# Patient Record
Sex: FEMALE | Race: WHITE | NOT HISPANIC OR LATINO | Employment: FULL TIME | URBAN - NONMETROPOLITAN AREA
[De-identification: names, ages, dates, MRNs, and addresses within clinical notes are randomized per-mention and may not be internally consistent; named-entity substitution may affect disease eponyms.]

---

## 2019-08-15 ENCOUNTER — TELEPHONE (OUTPATIENT)
Dept: BARIATRICS/WEIGHT MGMT | Facility: CLINIC | Age: 34
End: 2019-08-15

## 2019-08-15 NOTE — TELEPHONE ENCOUNTER
Patient called in stating that she had watched the online seminar and would like to make an appointment. Scheduled pt an appointment 9/5/19

## 2019-09-05 ENCOUNTER — OFFICE VISIT (OUTPATIENT)
Dept: BARIATRICS/WEIGHT MGMT | Facility: CLINIC | Age: 34
End: 2019-09-05

## 2019-09-05 ENCOUNTER — APPOINTMENT (OUTPATIENT)
Dept: BARIATRICS/WEIGHT MGMT | Facility: HOSPITAL | Age: 34
End: 2019-09-05

## 2019-09-05 VITALS
TEMPERATURE: 99.1 F | HEIGHT: 63 IN | WEIGHT: 293 LBS | SYSTOLIC BLOOD PRESSURE: 138 MMHG | OXYGEN SATURATION: 98 % | DIASTOLIC BLOOD PRESSURE: 89 MMHG | BODY MASS INDEX: 51.91 KG/M2 | HEART RATE: 94 BPM

## 2019-09-05 DIAGNOSIS — I10 ESSENTIAL HYPERTENSION: ICD-10-CM

## 2019-09-05 DIAGNOSIS — E66.01 CLASS 3 SEVERE OBESITY DUE TO EXCESS CALORIES WITH SERIOUS COMORBIDITY AND BODY MASS INDEX (BMI) OF 50.0 TO 59.9 IN ADULT (HCC): Primary | ICD-10-CM

## 2019-09-05 PROBLEM — E66.813 CLASS 3 SEVERE OBESITY DUE TO EXCESS CALORIES WITH SERIOUS COMORBIDITY AND BODY MASS INDEX (BMI) OF 50.0 TO 59.9 IN ADULT: Status: ACTIVE | Noted: 2019-09-05

## 2019-09-05 PROCEDURE — 99204 OFFICE O/P NEW MOD 45 MIN: CPT | Performed by: SURGERY

## 2019-09-05 RX ORDER — AMLODIPINE BESYLATE 5 MG/1
5 TABLET ORAL DAILY
Refills: 3 | COMMUNITY
Start: 2019-06-26

## 2019-09-05 RX ORDER — HYDROCHLOROTHIAZIDE 25 MG/1
25 TABLET ORAL DAILY
COMMUNITY

## 2019-09-05 NOTE — PROGRESS NOTES
"Nutrition Services    Patient Name:  Ginette Mireles  YOB: 1985  MRN: [id number]  Admit Date:  (Not on file)    NUTRITION BARIATRIC/[facility] NOTE     Visit 1  Initial Assessment      Anthropometrics   Height: 63 in  Weight: 295 lb 3.2 oz  BMI:52.3  Waist:    Nutrition Recall  Eating _3___ meals daily   Snacking-Snacks when she is at home  Limited sweet intake- Eats 1 or 2 at night, however not every night  Drinking carbonated beverages-1 per day, but usually drinks water or sweet tea  Drinking less than 64 fluid ounces    Exercise   Walking:    Education    4 meal per day diet plan  Goal Setting and Information Packet  Protein content of Food handout\"  Reinforce Nutritional Needs for Surgery  Reinforce Nutritional Needs for [facility]    Nutrition Goals   Eat __4___ meals per day with protein  Protein goal: 65gms     discussed protein guidelines for shakes and bar  Eliminate snacks  Healthier food choices  Portion control / Use smaller plate or measuring cup   Eliminate soda  Replace sugar beverages with artifical sweetened   Increase fluid intake to 64 ounces per day    Exercise Goals  Continue current exercise routine         Electronically signed by:  Cici Polk  09/05/19 10:04 AM   "

## 2019-09-05 NOTE — PROGRESS NOTES
Patient Care Team:  Camilo Lomas MD as PCP - General (Family Medicine)    Reason for Visit:  Surgical Weight loss    Subjective     Patient is a 34 y.o. female presents with morbid obesity and her Body mass index is 52.29 kg/m².     She is here for discussion of surgical weight loss options.  She stated she has been with the disease of obesity for year(s).  She stated she suffers from hypertension and morbid obesity due to her weight gain.  She stated that weight loss helps alleviate these symptoms.   She stated that she has tried counting calories and medications such as phentermine to help with weight loss.  She stated that she has attempted these conservative methods for weight loss without maintaining long term success.  Today she would like to discuss surgical weight loss options such as the Laparoscopic Sleeve Gastrectomy or the Laparoscopic R - Y Gastric Bypass.     Review of Systems  General ROS: positive for  - fatigue and weight gain  Psychological ROS: negative  Ophthalmic ROS: positive for - uses contacts and uses glasses  ENT ROS: negative  Endocrine ROS: positive for - hair pattern changes  Respiratory ROS: no cough, shortness of breath, or wheezing  Cardiovascular ROS: no chest pain or dyspnea on exertion  Gastrointestinal ROS: no abdominal pain, change in bowel habits, or black or bloody stools  Genito-Urinary ROS: positive for - incontinence  Musculoskeletal ROS: negative    History  Past Medical History:   Diagnosis Date   • Hypertension    • Varicose veins of both lower extremities      Past Surgical History:   Procedure Laterality Date   •  SECTION      x 1   • CHOLECYSTECTOMY      lap   • HERNIA REPAIR  1992     Family History   Problem Relation Age of Onset   • Cancer Mother    • Diabetes Mother    • Hypertension Mother    • Diabetes Father    • Hypertension Father    • Stroke Father    • Cancer Sister    • Hypertension Brother    • Cancer Maternal Grandmother    • COPD  Maternal Grandmother    • Diabetes Maternal Grandmother    • Hypertension Maternal Grandmother    • Obesity Maternal Grandmother    • Cancer Maternal Grandfather    • Hypertension Maternal Grandfather    • Hypertension Paternal Grandmother    • Heart disease Paternal Grandmother    • Stroke Paternal Grandmother    • Hypertension Paternal Grandfather    • No Known Problems Brother      Social History     Tobacco Use   • Smoking status: Former Smoker     Last attempt to quit: 2014     Years since quittin.0   • Smokeless tobacco: Never Used   • Tobacco comment: was a very light smoker   Substance Use Topics   • Alcohol use: Yes     Alcohol/week: 1.2 oz     Types: 2 Cans of beer per week     Comment: a couple of beers a month   • Drug use: No       (Not in a hospital admission)  Allergies:  Patient has no known allergies.      Current Outpatient Medications:   •  amLODIPine (NORVASC) 5 MG tablet, Take 5 mg by mouth Daily., Disp: , Rfl: 3  •  hydrochlorothiazide (HYDRODIURIL) 25 MG tablet, Take 25 mg by mouth Daily., Disp: , Rfl:     Objective     Vital Signs  Temp:  [99.1 °F (37.3 °C)] 99.1 °F (37.3 °C)  Heart Rate:  [94] 94  BP: (138)/(89) 138/89  Body mass index is 52.29 kg/m².      19  0906   Weight: 134 kg (295 lb 3.2 oz)       Physical Exam:      HEENT: extra ocular movement intact  Respiratory: appears well, vitals normal, no respiratory distress, acyanotic, normal RR, chest clear, no wheezing, crepitations, rhonchi, normal symmetric air entry  Cardiovascular: Regular rate and rhythm, S1, S2 normal, no murmur, click, rub or gallop  GI: Soft, non-tender, normal bowel sounds; no bruits, organomegaly or masses.  Abnormal shape: obese  Musculoskeletal: inspection - no abnormality  Neurologic: alert, oriented, normal speech, no focal findings or movement disorder noted       Results Review:   None        Assessment/Plan   Encounter Diagnoses   Name Primary?   • Class 3 severe obesity due to excess  "calories with serious comorbidity and body mass index (BMI) of 50.0 to 59.9 in adult (CMS/[facility]) Yes   • Essential hypertension        I believe this patient will be a good candidate for weight loss surgery.    She has chosen laparoscopic sleeve gastrectomy. I agree with this decision.  I have discussed the Ar - Y Gastric Bypass, laparoscopic sleeve gastrectomy and the Laparoscopic Gastric Band procedures to provide the alternatives which also includes non surgical weight loss options as well.  We discussed the benefits of the surgeries including the benefit of weight loss and the possible reversal of co-morbid conditions associated with morbid obesity. I explained to her that prior to making a definitive decision on the type of surgery she will require a study of the upper GI system.  I explained to her why the EGD is recommended.  She has been provided a structured dietary regimen based off of her behavior.  I discussed with the patient the etiology of the disease of obesity and the potential comorbid conditions associated with this disease.  She was instructed to follow the dietary regimen and follow-up with our program in 1 month's time with any additional questions as they may arise during this time.  We emphasized on focusing on proteins and meals high in fiber as well as adequate hydration that exceed 64 ounces of water daily.    I explained that I anticipate the patient to lose 6 pounds prior to her next monthly visit.  I have also explained that they need to record or document when they are going to have the \"cheat day\".  The patient reports that her hypertension has remained stable on its current treatment regimen.  I anticipate the patient's hypertension will improve if not reverse with reversal of her morbid obesity.  I discussed the patient's findings and my recommendations with patient.     I have also recommended that she obtain preoperative psychological evaluation, laboratory and cardiac evaluation " prior to surgery.    Dr. Hunter Santiago MD FACS    09/05/19  1:36 PM  Patient Care Team:  Camilo Lomas MD as PCP - General (Family Medicine)

## 2019-09-05 NOTE — ADDENDUM NOTE
Addended by: STACY BARCENAS on: 9/5/2019 01:37 PM     Modules accepted: Orders, Level of Service

## 2019-10-03 ENCOUNTER — OFFICE VISIT (OUTPATIENT)
Dept: BARIATRICS/WEIGHT MGMT | Facility: CLINIC | Age: 34
End: 2019-10-03

## 2019-10-03 ENCOUNTER — HOSPITAL ENCOUNTER (OUTPATIENT)
Dept: CARDIOLOGY | Facility: HOSPITAL | Age: 34
Discharge: HOME | End: 2019-10-03
Admitting: NURSE PRACTITIONER

## 2019-10-03 VITALS
BODY MASS INDEX: 51.38 KG/M2 | TEMPERATURE: 98.6 F | WEIGHT: 290 LBS | SYSTOLIC BLOOD PRESSURE: 126 MMHG | OXYGEN SATURATION: 98 % | DIASTOLIC BLOOD PRESSURE: 82 MMHG | HEIGHT: 63 IN | HEART RATE: 91 BPM

## 2019-10-03 DIAGNOSIS — E66.01 CLASS 3 SEVERE OBESITY DUE TO EXCESS CALORIES WITH SERIOUS COMORBIDITY AND BODY MASS INDEX (BMI) OF 50.0 TO 59.9 IN ADULT (HCC): ICD-10-CM

## 2019-10-03 DIAGNOSIS — I10 ESSENTIAL HYPERTENSION: ICD-10-CM

## 2019-10-03 DIAGNOSIS — Z01.818 ENCOUNTER FOR OTHER PREPROCEDURAL EXAMINATION: ICD-10-CM

## 2019-10-03 DIAGNOSIS — E66.01 CLASS 3 SEVERE OBESITY DUE TO EXCESS CALORIES WITH SERIOUS COMORBIDITY AND BODY MASS INDEX (BMI) OF 50.0 TO 59.9 IN ADULT (HCC): Primary | ICD-10-CM

## 2019-10-03 PROCEDURE — 99213 OFFICE O/P EST LOW 20 MIN: CPT | Performed by: NURSE PRACTITIONER

## 2019-10-03 PROCEDURE — 93010 ELECTROCARDIOGRAM REPORT: CPT | Performed by: INTERNAL MEDICINE

## 2019-10-03 PROCEDURE — 93005 ELECTROCARDIOGRAM TRACING: CPT | Performed by: NURSE PRACTITIONER

## 2019-10-03 NOTE — PROGRESS NOTES
Patient Care Team:  Camilo Lomas MD as PCP - General (Family Medicine)    Reason for Visit:  Surgical Weight Loss    Subjective        Ginette Mireles is a 34 y.o. year old female who is here for follow-up and continued medical management of morbid obesity. Her current Body mass index is 51.37 kg/m². She states she suffers from hypertension and morbid obesity due to weight gain. She is currently following the 4 meals/day diet prescription. Ginette Mireles previously agreed to incorporate the prescription as provided, while also increasing physical exercise. Patient states she has been successful at limiting bread intake, eating 3-4 times per day, and limiting carbohydrates after lunch. She has been exercising by walking and doing crunches three times per week. Ginette Mireles also states she has been drinking 64 ounces of water and is getting 65 grams of protein intake per day. She has lost 5 lbs of weight since her last visit.     Review of Systems  Negative except the below listed  General ROS: positive for  - weight gain    History  Past Medical History:   Diagnosis Date   • Hypertension    • Varicose veins of both lower extremities      Past Surgical History:   Procedure Laterality Date   •  SECTION      x 1   • CHOLECYSTECTOMY      lap   • HERNIA REPAIR       Family History   Problem Relation Age of Onset   • Cancer Mother    • Diabetes Mother    • Hypertension Mother    • Diabetes Father    • Hypertension Father    • Stroke Father    • Cancer Sister    • Hypertension Brother    • Cancer Maternal Grandmother    • COPD Maternal Grandmother    • Diabetes Maternal Grandmother    • Hypertension Maternal Grandmother    • Obesity Maternal Grandmother    • Cancer Maternal Grandfather    • Hypertension Maternal Grandfather    • Hypertension Paternal Grandmother    • Heart disease Paternal Grandmother    • Stroke Paternal Grandmother    • Hypertension Paternal Grandfather    • No Known  Problems Brother      Social History     Tobacco Use   • Smoking status: Current Some Day Smoker     Last attempt to quit: 2014     Years since quittin.0   • Smokeless tobacco: Never Used   • Tobacco comment: was a very light smoker   Substance Use Topics   • Alcohol use: Yes     Alcohol/week: 1.2 oz     Types: 2 Cans of beer per week     Comment: a couple of beers a month   • Drug use: No       (Not in a hospital admission)  Allergies:  Patient has no known allergies.      Current Outpatient Medications:   •  amLODIPine (NORVASC) 5 MG tablet, Take 5 mg by mouth Daily., Disp: , Rfl: 3  •  hydrochlorothiazide (HYDRODIURIL) 25 MG tablet, Take 25 mg by mouth Daily., Disp: , Rfl:     Objective     Vital Signs  Temp:  [98.6 °F (37 °C)] 98.6 °F (37 °C)  Heart Rate:  [91] 91  BP: (126)/(82) 126/82  Body mass index is 51.37 kg/m².      10/03/19  1041   Weight: 132 kg (290 lb)       Physical Exam:  HEENT: extra ocular movement intact  Respiratory:appears well, vitals normal, no respiratory distress, acyanotic, normal RR, chest clear, no wheezing, crepitations, rhonchi, normal symmetric air entry  Cardiovascular: Regular rate and rhythm, S1, S2 normal, no murmur, click, rub or gallop  GI: Soft, non-tender, normal bowel sounds; no bruits, organomegaly or masses. Abnormal shape: Obese  Musculoskeletal: inspection - no abnormality, range of motion normal  Neurologic: alert, oriented, normal speech, no focal findings or movement disorder noted       Results Review:   None        Assessment/Plan   Encounter Diagnoses   Name Primary?   • Class 3 severe obesity due to excess calories with serious comorbidity and body mass index (BMI) of 50.0 to 59.9 in adult (CMS/HCC) Yes   • Essential hypertension    • Encounter for other preprocedural examination          . April M Aline was seen today for follow-up, obesity, nutrition counseling and weight loss. She has lost 5 lbs of weight since her last visit, making her BMI 51.4.  Today we discussed consistency with healthy changes in lifestyle, diet, and exercise for long term success. Ginette Mireles had received handouts to her explaining the recommendation on portion sizes/appetite control/reading nutrition labels. Intensive behavioral therapy for obesity was done today as well.     Dr. Santiago and I believe this patient will be a good candidate for weight loss surgery.  He has discussed the Ar - Y Gastric Bypass, laparoscopic sleeve gastrectomy and the Laparoscopic Gastric Band procedures with the patient.  We discussed the benefits of the surgeries including the benefit of weight loss and the possible reversal of co-morbid conditions associated with morbid obesity. We have explained to the patient that prior to making a definitive decision on the type of surgery she will require an esophagogastroduodenoscopy with biopsies to assess for any contraindications for surgical weight loss.  The alternatives  include not doing anything, or pursuing an UGI series which only offers a diagnosis with potential less accuracy compared to EGD. The benefits of the EGD such as identifying the pathology and anatomy of the upper GI system and the complications and risks of the procedure.    The risk of the endoscopy were discussed in detail. We discussed the risk of perforation (one out of 9281-8463, riskier with dilation), bleeding (one out of 500), and the rare risks of infection, adverse reaction to anesthesia, respiratory failure, cardiac failure including MI and adverse reaction to medications, etc. We discussed consequences that could occur if a risk were to develop such as the need for hospitalization, blood transfusion, surgical intervention, medications, pain, disability and death. The patient verbalizes understanding and agrees to proceed. such as bleeding, perforation, swallowing difficulties and gas bloat can occur after this procedure. Upon completion of our discussion and addressing and  answering her questions to her satisfation, informed consent was obtained.  She will be scheduled accordingly for the esophagogastroduodenoscopy procedure.    Goals for this month are: eliminate sodas completely, eliminate carbohydrates after lunch, eat 4 times per day, and continue to incorporate healthy behaviors implemented thus far. Patient encouraged to call with questions and/or struggles as they may arise prior to next scheduled appointment.    2. Current comorbid condition of hypertension associated with her morbid obesity is reported to be stable on her current treatment regimen and medications. We anticipate the comorbid condition to improve as we address her morbid obesity.    3. Pre-procedural Examination - EGD, EKG, and psychology referral ordered today.     Follow up in 1 month with Dr. Santiago for a weight recheck and to discuss EGD results as well as bariatric surgery recommendations.    CHAR Bergman    10/03/19  12:13 PM  Patient Care Team:  Camilo Lomas MD as PCP - General (Family Medicine)

## 2019-10-07 ENCOUNTER — TELEPHONE (OUTPATIENT)
Dept: BARIATRICS/WEIGHT MGMT | Facility: CLINIC | Age: 34
End: 2019-10-07

## 2019-10-07 DIAGNOSIS — Z01.818 ENCOUNTER FOR OTHER PREPROCEDURAL EXAMINATION: ICD-10-CM

## 2019-10-07 DIAGNOSIS — I10 ESSENTIAL HYPERTENSION: ICD-10-CM

## 2019-10-07 DIAGNOSIS — E66.01 CLASS 3 SEVERE OBESITY DUE TO EXCESS CALORIES WITH SERIOUS COMORBIDITY AND BODY MASS INDEX (BMI) OF 50.0 TO 59.9 IN ADULT (HCC): ICD-10-CM

## 2019-10-07 DIAGNOSIS — R94.31 ABNORMAL ECG: Primary | ICD-10-CM

## 2019-10-07 NOTE — TELEPHONE ENCOUNTER
Called patient regarding insurance coverage and upcoming EGD. Patient does not have bariatric surgery coverage on her policy. They will cover lap bands and that is the only thing. I informed the patient that Dr. Santiago would not be able to do surgery since he does not do lap band placement and informed the patient that she could come for medical weight loss. She said she would think about it and speak to her  and call back. She asked me to cancel her upcoming appointment until she makes a decision.

## 2019-10-07 NOTE — TELEPHONE ENCOUNTER
April returned my called and I informed her of the abnormal ekg and that a referral had been placed to the cardiologist. She voiced an understanding and was agreeable.

## 2025-08-11 ENCOUNTER — OFFICE VISIT (OUTPATIENT)
Dept: SURGERY | Facility: CLINIC | Age: 40
End: 2025-08-11
Payer: COMMERCIAL

## 2025-08-11 VITALS
DIASTOLIC BLOOD PRESSURE: 88 MMHG | SYSTOLIC BLOOD PRESSURE: 124 MMHG | HEIGHT: 63 IN | WEIGHT: 293 LBS | BODY MASS INDEX: 51.91 KG/M2

## 2025-08-11 DIAGNOSIS — F17.210 NICOTINE DEPENDENCE, CIGARETTES, UNCOMPLICATED: ICD-10-CM

## 2025-08-11 DIAGNOSIS — K42.9 PERIUMBILICAL HERNIA: Primary | ICD-10-CM

## 2025-08-11 DIAGNOSIS — E66.813 CLASS 3 SEVERE OBESITY DUE TO EXCESS CALORIES WITH SERIOUS COMORBIDITY AND BODY MASS INDEX (BMI) OF 50.0 TO 59.9 IN ADULT: ICD-10-CM

## 2025-08-11 PROCEDURE — 99204 OFFICE O/P NEW MOD 45 MIN: CPT | Performed by: STUDENT IN AN ORGANIZED HEALTH CARE EDUCATION/TRAINING PROGRAM

## 2025-08-11 RX ORDER — METOPROLOL SUCCINATE 25 MG/1
1 TABLET, EXTENDED RELEASE ORAL DAILY
COMMUNITY
Start: 2025-06-14

## 2025-08-11 RX ORDER — LOSARTAN POTASSIUM 25 MG/1
25 TABLET ORAL DAILY
COMMUNITY
Start: 2023-02-20

## 2025-08-11 RX ORDER — FUROSEMIDE 20 MG/1
20 TABLET ORAL DAILY
COMMUNITY
Start: 2025-07-16

## 2025-08-12 ENCOUNTER — PATIENT ROUNDING (BHMG ONLY) (OUTPATIENT)
Dept: SURGERY | Facility: CLINIC | Age: 40
End: 2025-08-12
Payer: COMMERCIAL